# Patient Record
Sex: FEMALE | Race: BLACK OR AFRICAN AMERICAN | NOT HISPANIC OR LATINO | URBAN - METROPOLITAN AREA
[De-identification: names, ages, dates, MRNs, and addresses within clinical notes are randomized per-mention and may not be internally consistent; named-entity substitution may affect disease eponyms.]

---

## 2017-05-12 ENCOUNTER — EMERGENCY (EMERGENCY)
Facility: HOSPITAL | Age: 27
LOS: 1 days | Discharge: HOME | End: 2017-05-12
Attending: EMERGENCY MEDICINE | Admitting: EMERGENCY MEDICINE
Payer: OTHER MISCELLANEOUS

## 2017-05-12 VITALS
RESPIRATION RATE: 16 BRPM | TEMPERATURE: 98 F | SYSTOLIC BLOOD PRESSURE: 118 MMHG | DIASTOLIC BLOOD PRESSURE: 83 MMHG | OXYGEN SATURATION: 96 % | HEART RATE: 72 BPM

## 2017-05-12 DIAGNOSIS — S30.0XXA CONTUSION OF LOWER BACK AND PELVIS, INITIAL ENCOUNTER: ICD-10-CM
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
## 2017-05-12 DIAGNOSIS — S80.01XA CONTUSION OF RIGHT KNEE, INITIAL ENCOUNTER: ICD-10-CM
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
## 2017-05-12 DIAGNOSIS — M25.561 PAIN IN RIGHT KNEE: ICD-10-CM
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
## 2017-05-12 DIAGNOSIS — S70.02XA CONTUSION OF LEFT HIP, INITIAL ENCOUNTER: ICD-10-CM

## 2017-05-12 PROCEDURE — 73562 X-RAY EXAM OF KNEE 3: CPT | Mod: 26,RT

## 2017-05-12 PROCEDURE — 99284 EMERGENCY DEPT VISIT MOD MDM: CPT | Mod: 25

## 2017-05-12 RX ORDER — KETOROLAC TROMETHAMINE 30 MG/ML
30 SYRINGE (ML) INJECTION ONCE
Qty: 0 | Refills: 0 | Status: DISCONTINUED | OUTPATIENT
Start: 2017-05-12 | End: 2017-05-12

## 2017-05-12 RX ADMIN — Medication 30 MILLIGRAM(S): at 20:19

## 2017-05-12 NOTE — ED PROVIDER NOTE - DIAGNOSTIC INTERPRETATION
Interpreted by ED physician Dr Arana   right Knee x-ray, 3 views  No fracture, no dislocation (joint spaces grossly normal), no Foreign Body noted, soft tissue normal

## 2017-05-12 NOTE — ED PROVIDER NOTE - OBJECTIVE STATEMENT
26 y/o female with no PMHx presents to the ED for back pain. Patient works for [employer] in the [employer] department. Patient fell between the platform and the train. Patient denies train moving. Sustained pain to right knee, left hip, and lower back. Denies hitting head. 26 y/o female with no PMHx presents to the ED for back pain. Patient works for [employer] in the [employer] department. Patient fell between the platform and the train. Patient denies train moving. Sustained pain to right knee, left hip, and lower back. Denies hitting head.

## 2017-05-12 NOTE — ED PROVIDER NOTE - MUSCULOSKELETAL, MLM
No midline tenderness to the spine, mild tenderness over left lumbar paraspinal musculature. full ROM, able to bear weight. Point tenderness to medical aspect to joint and tibial plautea. Patella tendon completely intact.

## 2017-05-12 NOTE — ED PROVIDER NOTE - MEDICAL DECISION MAKING DETAILS
Patient here for trip and fall while at work. Will x-ray knee to r/o fx. Patient here for trip and fall while at work. Will x-ray knee to r/o fx.  No other concern for fracture.  +Contusions.  RICE.  Toradol now for pain.

## 2019-12-19 NOTE — ED PROVIDER NOTE - CONSTITUTIONAL, MLM
To get better and follow your care plan as instructed. normal... Well appearing, well nourished, awake, alert, oriented to person, place, time/situation and in no apparent distress.